# Patient Record
Sex: FEMALE | Race: WHITE | NOT HISPANIC OR LATINO | Employment: OTHER
[De-identification: names, ages, dates, MRNs, and addresses within clinical notes are randomized per-mention and may not be internally consistent; named-entity substitution may affect disease eponyms.]

---

## 2024-06-26 ENCOUNTER — TRANSCRIBE ORDERS (OUTPATIENT)
Dept: OTHER | Age: 69
End: 2024-06-26

## 2024-06-26 DIAGNOSIS — R52 RADIATING PAIN: ICD-10-CM

## 2024-06-26 DIAGNOSIS — M54.2 NECK PAIN: ICD-10-CM

## 2024-06-26 DIAGNOSIS — M54.50 ACUTE LOW BACK PAIN: Primary | ICD-10-CM

## 2024-06-26 DIAGNOSIS — M79.2 RADICULAR PAIN IN RIGHT ARM: ICD-10-CM

## 2024-06-27 ENCOUNTER — TRANSCRIBE ORDERS (OUTPATIENT)
Dept: OTHER | Age: 69
End: 2024-06-27

## 2024-06-27 DIAGNOSIS — M79.2 RADICULAR PAIN IN RIGHT ARM: ICD-10-CM

## 2024-06-27 DIAGNOSIS — R52 RADIATING PAIN: ICD-10-CM

## 2024-06-27 DIAGNOSIS — M54.50 ACUTE LOW BACK PAIN: Primary | ICD-10-CM

## 2024-07-09 NOTE — TELEPHONE ENCOUNTER
SPINE PATIENTS - NEW PROTOCOL PREVISIT    RECORDS RECEIVED FROM: Referred by Meet Lugo   REASON FOR VISIT: Spine  Acute low back pain   Radiating pain   Radicular pain in right arm    PROVIDER: Radha   DATE OF APPT: 07/10/2024   NOTES (FOR ALL VISITS) STATUS DETAILS   OFFICE NOTE from referring provider Internal Referral and notes in chart   OFFICE NOTE from other specialist Internal Cervical injection w/Rayus   DISCHARGE SUMMARY from hospital N/A    DISCHARGE REPORT from ER N/A    OPERATIVE REPORT N/A    EMG REPORT N/A    MEDICATION LIST N/A    IMAGING  (FOR ALL VISITS)     MRI (HEAD, NECK, SPINE) Internal MR cervical w/Rayus 05/08/2024   XRAY (SPINE) *NEUROSURGERY* N/A    CT (HEAD, NECK, SPINE) N/A

## 2024-07-10 ENCOUNTER — PRE VISIT (OUTPATIENT)
Dept: NEUROSURGERY | Facility: CLINIC | Age: 69
End: 2024-07-10
Payer: COMMERCIAL

## 2024-07-10 ENCOUNTER — OFFICE VISIT (OUTPATIENT)
Dept: NEUROSURGERY | Facility: CLINIC | Age: 69
End: 2024-07-10
Attending: NURSE PRACTITIONER
Payer: COMMERCIAL

## 2024-07-10 ENCOUNTER — DOCUMENTATION ONLY (OUTPATIENT)
Dept: NEUROSURGERY | Facility: CLINIC | Age: 69
End: 2024-07-10
Payer: COMMERCIAL

## 2024-07-10 VITALS
HEIGHT: 70 IN | BODY MASS INDEX: 32.93 KG/M2 | SYSTOLIC BLOOD PRESSURE: 117 MMHG | DIASTOLIC BLOOD PRESSURE: 72 MMHG | WEIGHT: 230 LBS

## 2024-07-10 DIAGNOSIS — M54.12 CERVICAL RADICULOPATHY: Primary | ICD-10-CM

## 2024-07-10 DIAGNOSIS — M79.2 RADICULAR PAIN IN RIGHT ARM: ICD-10-CM

## 2024-07-10 PROCEDURE — G0463 HOSPITAL OUTPT CLINIC VISIT: HCPCS | Performed by: PHYSICIAN ASSISTANT

## 2024-07-10 PROCEDURE — 99203 OFFICE O/P NEW LOW 30 MIN: CPT | Performed by: PHYSICIAN ASSISTANT

## 2024-07-10 RX ORDER — GABAPENTIN 300 MG/1
600 CAPSULE ORAL
COMMUNITY
Start: 2024-06-27

## 2024-07-10 RX ORDER — AMLODIPINE AND BENAZEPRIL HYDROCHLORIDE 5; 40 MG/1; MG/1
1 CAPSULE ORAL DAILY
COMMUNITY
Start: 2023-12-15

## 2024-07-10 RX ORDER — ATORVASTATIN CALCIUM 20 MG/1
1 TABLET, FILM COATED ORAL AT BEDTIME
COMMUNITY
Start: 2023-12-15

## 2024-07-10 RX ORDER — LIRAGLUTIDE 6 MG/ML
INJECTION SUBCUTANEOUS
COMMUNITY
Start: 2024-04-26

## 2024-07-10 RX ORDER — CLOBETASOL PROPIONATE 0.5 MG/G
CREAM TOPICAL
COMMUNITY
Start: 2023-09-15

## 2024-07-10 ASSESSMENT — PAIN SCALES - GENERAL: PAINLEVEL: EXTREME PAIN (8)

## 2024-07-10 NOTE — PROGRESS NOTES
"NEUROSURGERY CLINIC CONSULT NOTE     DATE OF VISIT: 7/10/2024     SUBJECTIVE:     Nancy L Pleschourt is a pleasant 69 year old female who presents to the clinic today for consultation on cervical radiculopathy-right sided. She is referred to the Neurosurgery Clinic by Dr. Parish CEDILLO. Pertinent medical history consists of seen by NGUYEN Lugo who recommended surgical intervention but was not sure on levels he would operate on and therefore referred to our team for second surgical opinion.     Today, she reports a 3-month history of symptoms. She describes interrmittent sharp pain from her neck into her deltoid and \"pins and needles pain\" in her forearm; does not radiate into her right hand. This pain is accompanied by paresthesia, numbness and/or perceived weakness in the same distribution. Movement, lifting aggravate the symptoms, while alleviation is obtained by nothing. Moving heavy chair in mid April is associated with the onset of the pain. There are no bowel or bladder changes. She denies saddle anesthesia. She denies changes in gait, instability, or falling episodes. There has been significant change in her handwriting or hand dexterity. She also can no longer lift her granddaughter due to weakness in RUE.     She has participated in conservative therapies to include physical therapy, NSAIDs, heat, ice, a T1-T2 epidural steroid injection. None of these modalities have provided any significant long term relief.     Patient is diabetic, able to bring her A1C down from 11 to 8.0 recently. No blood thinners.      Current Outpatient Medications:     amLODIPine-benazepril (LOTREL) 5-40 MG capsule, Take 1 capsule by mouth daily, Disp: , Rfl:     atorvastatin (LIPITOR) 20 MG tablet, Take 1 tablet by mouth at bedtime, Disp: , Rfl:     clobetasol propionate (TEMOVATE) 0.05 % external cream, Apply topically to affected area(s) two times daily., Disp: , Rfl:     Continuous Glucose  (FREESTYLE JAYJAY 2 READER) " "FRANCOIS, , Disp: , Rfl:     Continuous Glucose Sensor (FREESTYLE JAYJAY 2 SENSOR) MISC, To be used to read blood sugars per 's directions., Disp: , Rfl:     Continuous Glucose Sensor (FREESTYLE JAYJAY 2 SENSOR) MISC, , Disp: , Rfl:     CONTOUR NEXT TEST test strip, , Disp: , Rfl:     gabapentin (NEURONTIN) 300 MG capsule, Take 600 mg by mouth, Disp: , Rfl:     insulin NPH-Regular 70/30 (HUMULIN 70/30;NOVOLIN 70/30) (70-30) 100 UNIT/ML vial, 90 units SQ before breakfast and 65 units before dinner., Disp: , Rfl:     liraglutide (VICTOZA PEN) 18 MG/3ML solution, Inject 1.8mg subcutaneous once daily, Disp: , Rfl:      Allergies   Allergen Reactions    Metformin Diarrhea        No past medical history on file.     ROS: 10 point review of symptoms are negative other than the symptoms noted above in the HPI.     Family History has been reviewed with the patient, there are no pertinent findings to presenting concern.     No past surgical history on file.     Social History     Tobacco Use    Smoking status: Former     Types: Cigarettes    Smokeless tobacco: Never        OBJECTIVE:   /72   Ht 1.778 m (5' 10\")   Wt 104.3 kg (230 lb)   BMI 33.00 kg/m     Body mass index is 33 kg/m .     Imaging:     SEE MEDIA FOR IMAGING REPORTS     Full radiological report in chart. Imaging was reviewed with with patient today.     Exam:   CN II-XII grossly intact, alert and appropriate with conversation and following commands.   Gait is non-antalgic.   Cervical spine is tender to palpation mid to lower. Appropriate range of motion of neck, not concerning for lhermitte's phenomenon. +spurlings right sided   Bilateral bicep 2/4 and tricep reflexes 1/4. Sensation intact throughout upper extremities.     UE muscle strength  Right  Left    Deltoid  5/5  5/5    Biceps  5/5  5/5    Triceps   Wrist flexion  4/5  4/5  5/5   5/5   Hand intrinsics  4/5  5/5    Hand grasp  4/5  5/5    Zazueta signs  neg  neg      Intact sensation " "throughout lower extremities.   Bilateral patellar 2/4 and achilles reflex 1/4.     LE muscle strength  Right  Left    Iliopsoas (hip flexion)  5/5  5/5    Quad (knee extension)  5/5  5/5    Hamstring (knee flexion)  5/5  5/5    Gastrocnemius (PF)  5/5  5/5    Tibialis Ant. (DF)  5/5  5/5    EHL  5/5  5/5    Negative for clonus.   Calves are soft and non-tender bilaterally.     ASSESSMENT/PLAN:     Nancy L Pleschourt is a pleasant 69 year old female who presents to the clinic today for consultation on cervical radiculopathy-right sided. She is referred to the Neurosurgery Clinic by Dr. Parish CEDILLO. Pertinent medical history consists of seen by [facility] Dr. Lugo who recommended surgical intervention but was not sure on levels he would operate on and therefore referred to our team for second surgical opinion.     Today, she reports a 3-month history of symptoms. She describes interrmittent sharp pain from her neck into her deltoid and \"pins and needles pain\" in her forearm; does not radiate into her right hand. This pain is accompanied by paresthesia, numbness and/or perceived weakness in the same distribution. Movement, lifting aggravate the symptoms, while alleviation is obtained by nothing. Moving heavy chair in mid April is associated with the onset of the pain. There are no bowel or bladder changes. She denies saddle anesthesia. She denies changes in gait, instability, or falling episodes. There has been significant change in her handwriting or hand dexterity. She also can no longer lift her granddaughter due to weakness in RUE.     She has participated in conservative therapies to include physical therapy, NSAIDs, heat, ice, a T1-T2 epidural steroid injection. None of these modalities have provided any significant long term relief.      PLAN:   -EMG urgent referral sent. They will call you to schedule   -follow up with Dr. Santos or Dr. Olmstead after EMG to review surgical options  -continue PT and other conservative " measures you have been doing at home     Contact our office or present sooner should symptoms significantly worsen     Matilde Larkin PA-C  [facility]  [address] [address]  Tel [phone]  Fax [phone]

## 2024-07-10 NOTE — NURSING NOTE
"Nancy L Pleschourt is a 69 year old female who presents for:  Chief Complaint   Patient presents with    Consult        Vitals:    Vitals:    07/10/24 1006   BP: 117/72   Weight: 230 lb (104.3 kg)   Height: 5' 10\" (1.778 m)       BMI:  Estimated body mass index is 33 kg/m  as calculated from the following:    Height as of this encounter: 5' 10\" (1.778 m).    Weight as of this encounter: 230 lb (104.3 kg).    Pain Score:  Extreme Pain (8)        Amendo Phorn      "

## 2024-07-10 NOTE — CONFIDENTIAL NOTE
Scrubbing chart and requesting appropriate cervical records.    P [phone] - [facility]  Requesting cervical MRI from 5/8/24 and cervical XR from week prior pushed to [facility]. Being pushed now. Writer will create order exam and load imaging in to pt chart with report attached in [facility] once imaging is received in [facility] [place].     Orders made. Imaging loaded in. Provider updated.      Signed by Callie Webb on July 10, 2024 10:49 AM

## 2024-07-10 NOTE — PATIENT INSTRUCTIONS
-EMG urgent referral sent. They will call you to schedule   -follow up with Dr. Santos after EMG to review surgical options  -continue PT and other conservative measures you have been doing at home     Contact our office or present sooner should symptoms significantly worsen     Matilde Larkin PA-C  [facility]  [address] [place] [address]  Tel [phone]  Fax [phone]

## 2024-07-10 NOTE — LETTER
"7/10/2024      Nancy L Pleschourt  [address] [place] [id number]      Dear Colleague,    Thank you for referring your patient, Nancy L Pleschourt, to the [facility]OS[facility] [place]. Please see a copy of my visit note below.    NEUROSURGERY CLINIC CONSULT NOTE     DATE OF VISIT: 7/10/2024     SUBJECTIVE:     Nancy L Pleschourt is a pleasant 69 year old female who presents to the clinic today for consultation on cervical radiculopathy-right sided. She is referred to the Neurosurgery Clinic by Dr. Parish CEDILLO. Pertinent medical history consists of seen by NGUYEN Lugo who recommended surgical intervention but was not sure on levels he would operate on and therefore referred to our team for second surgical opinion.     Today, she reports a 3-month history of symptoms. She describes interrmittent sharp pain from her neck into her deltoid and \"pins and needles pain\" in her forearm; does not radiate into her right hand. This pain is accompanied by paresthesia, numbness and/or perceived weakness in the same distribution. Movement, lifting aggravate the symptoms, while alleviation is obtained by nothing. Moving heavy chair in mid April is associated with the onset of the pain. There are no bowel or bladder changes. She denies saddle anesthesia. She denies changes in gait, instability, or falling episodes. There has been significant change in her handwriting or hand dexterity. She also can no longer lift her granddaughter due to weakness in RUE.     She has participated in conservative therapies to include physical therapy, NSAIDs, heat, ice, a T1-T2 epidural steroid injection. None of these modalities have provided any significant long term relief.     Patient is diabetic, able to bring her A1C down from 11 to 8.0 recently. No blood thinners.      Current Outpatient Medications:      amLODIPine-benazepril (LOTREL) 5-40 MG capsule, Take 1 capsule by mouth daily, Disp: , Rfl:      " "atorvastatin (LIPITOR) 20 MG tablet, Take 1 tablet by mouth at bedtime, Disp: , Rfl:      clobetasol propionate (TEMOVATE) 0.05 % external cream, Apply topically to affected area(s) two times daily., Disp: , Rfl:      Continuous Glucose  (FREESTYLE JAYJAY 2 READER) FRANCOIS, , Disp: , Rfl:      Continuous Glucose Sensor (FREESTYLE JAYJAY 2 SENSOR) MISC, To be used to read blood sugars per 's directions., Disp: , Rfl:      Continuous Glucose Sensor (FREESTYLE JAYJAY 2 SENSOR) MISC, , Disp: , Rfl:      CONTOUR NEXT TEST test strip, , Disp: , Rfl:      gabapentin (NEURONTIN) 300 MG capsule, Take 600 mg by mouth, Disp: , Rfl:      insulin NPH-Regular 70/30 (HUMULIN 70/30;NOVOLIN 70/30) (70-30) 100 UNIT/ML vial, 90 units SQ before breakfast and 65 units before dinner., Disp: , Rfl:      liraglutide (VICTOZA PEN) 18 MG/3ML solution, Inject 1.8mg subcutaneous once daily, Disp: , Rfl:      Allergies   Allergen Reactions     Metformin Diarrhea        No past medical history on file.     ROS: 10 point review of symptoms are negative other than the symptoms noted above in the HPI.     Family History has been reviewed with the patient, there are no pertinent findings to presenting concern.     No past surgical history on file.     Social History     Tobacco Use     Smoking status: Former     Types: Cigarettes     Smokeless tobacco: Never        OBJECTIVE:   /72   Ht 1.778 m (5' 10\")   Wt 104.3 kg (230 lb)   BMI 33.00 kg/m     Body mass index is 33 kg/m .     Imaging:     SEE MEDIA FOR IMAGING REPORTS     Full radiological report in chart. Imaging was reviewed with with patient today.     Exam:   CN II-XII grossly intact, alert and appropriate with conversation and following commands.   Gait is non-antalgic.   Cervical spine is tender to palpation mid to lower. Appropriate range of motion of neck, not concerning for lhermitte's phenomenon. +spurlings right sided   Bilateral bicep 2/4 and tricep reflexes " "1/4. Sensation intact throughout upper extremities.     UE muscle strength  Right  Left    Deltoid  5/5  5/5    Biceps  5/5  5/5    Triceps   Wrist flexion  4/5  4/5  5/5   5/5   Hand intrinsics  4/5  5/5    Hand grasp  4/5  5/5    Zazueta signs  neg  neg      Intact sensation throughout lower extremities.   Bilateral patellar 2/4 and achilles reflex 1/4.     LE muscle strength  Right  Left    Iliopsoas (hip flexion)  5/5  5/5    Quad (knee extension)  5/5  5/5    Hamstring (knee flexion)  5/5  5/5    Gastrocnemius (PF)  5/5  5/5    Tibialis Ant. (DF)  5/5  5/5    EHL  5/5  5/5    Negative for clonus.   Calves are soft and non-tender bilaterally.     ASSESSMENT/PLAN:     Nancy L Pleschourt is a pleasant 69 year old female who presents to the clinic today for consultation on cervical radiculopathy-right sided. She is referred to the Neurosurgery Clinic by Dr. Parish CEDILLO. Pertinent medical history consists of seen by [facility] Dr. Lugo who recommended surgical intervention but was not sure on levels he would operate on and therefore referred to our team for second surgical opinion.     Today, she reports a 3-month history of symptoms. She describes interrmittent sharp pain from her neck into her deltoid and \"pins and needles pain\" in her forearm; does not radiate into her right hand. This pain is accompanied by paresthesia, numbness and/or perceived weakness in the same distribution. Movement, lifting aggravate the symptoms, while alleviation is obtained by nothing. Moving heavy chair in mid April is associated with the onset of the pain. There are no bowel or bladder changes. She denies saddle anesthesia. She denies changes in gait, instability, or falling episodes. There has been significant change in her handwriting or hand dexterity. She also can no longer lift her granddaughter due to weakness in RUE.     She has participated in conservative therapies to include physical therapy, NSAIDs, heat, ice, a T1-T2 epidural " steroid injection. None of these modalities have provided any significant long term relief.      PLAN:   -EMG urgent referral sent. They will call you to schedule   -follow up with Dr. Santos or Dr. Olmstead after EMG to review surgical options  -continue PT and other conservative measures you have been doing at home     Contact our office or present sooner should symptoms significantly worsen     Matilde Larkin PA-C  [facility]  [address] [place] [address]  Tel [phone]  Fax [phone]      Again, thank you for allowing me to participate in the care of your patient.        Sincerely,        Matilde Garcia PA-C

## 2024-07-11 ENCOUNTER — OFFICE VISIT (OUTPATIENT)
Dept: PHYSICAL MEDICINE AND REHAB | Facility: CLINIC | Age: 69
End: 2024-07-11
Attending: PHYSICIAN ASSISTANT
Payer: COMMERCIAL

## 2024-07-11 DIAGNOSIS — M79.2 RADICULAR PAIN IN RIGHT ARM: Primary | ICD-10-CM

## 2024-07-11 DIAGNOSIS — M54.12 CERVICAL RADICULOPATHY: ICD-10-CM

## 2024-07-11 PROCEDURE — 95886 MUSC TEST DONE W/N TEST COMP: CPT | Performed by: PHYSICAL MEDICINE & REHABILITATION

## 2024-07-11 PROCEDURE — 95910 NRV CNDJ TEST 7-8 STUDIES: CPT | Performed by: PHYSICAL MEDICINE & REHABILITATION

## 2024-07-11 NOTE — PROGRESS NOTES
[facility]  [address] [place] [address] [address]  [place] [address]  Office: [phone] Fax: [phone]    Electromyography and Nerve Conduction Study Report        Indication: Patient presents at the request of Matilde Garcia PA-C for right upper extremity EMG.  She has several month history of cervical spine pain with more significantly right parascapular pain chest wall pain and right arm pain and paresthesias into the wrist and weakness of the right hand.  On exam, she has normal sensation to light touch throughout the bilateral upper extremities.  2+ bicep, tricep, brachioradialis reflexes with negative Bruna's.  5/5  5 -/5 strength interosseous of the right hand bilateral wrist extensors, long finger flexors, elbow flexors and extensors.        Pt Exam Discussion (Communication Barriers):  Electromyography and nerve conduction testing, including associated discomfort, risks, benefits, and alternatives was discussed with the patient prior to the procedure.  No learning/ communication barriers; patient verbalized understanding of procedure.  Informed consent was obtained.           Pt Assessment:  Testing was successfully completed; patient tolerated testing well.       Blood Thinners: None Skin Temperature: Warmed 33.8                     EMG/NCS  results:     Nerve Conduction Studies  Motor Sites      Segment Distal Latency Neg. Amp CV F-Latency F-Estimate Comment   Site  (ms) (mV) (m/s) (ms) (ms)    Right Median (APB) Motor   Wrist Wrist-APB 4.0 1.58       Elbow Elbow-Wrist 9.2 *1.16 49      Right Ulnar (ADM) Motor   Wrist  2.6 7.2       Bel Elbow Bel Elbow-Wrist 7.0 6.3 53      Ab Elbow Ab Elbow-Wrist 9.3 5.8 53        Sensory Sites      Onset Lat Peak Lat Amp CV Comment   Site (ms) (ms) ( V) (m/s)    Left Medial Antebrachial Cutaneous Sensory   Elbow-Med Forearm 1.50 1.83 11 -    Right Medial Antebrachial Cutaneous Sensory   Elbow-Med Forearm 1.13 1.78 11 -    Right Median  Anti Sensory   Wrist-Dig II 2.3 2.9 19 57    Right Median-Ulnar Palmar Sensory        Median   Palm-Wrist 1.40 1.83 78 57         Ulnar   Palm-Wrist 1.40 1.80 26 57    Right Radial Sensory   Forearm-Wrist 1.90 2.2 22 53    Right Ulnar Anti Sensory   Wrist-Dig V 2.1 2.5 13 52        NCS Waveforms:    Motor         Sensory                      Electromyography     Side Muscle Nerve Root Ins Act Fibs Psw Fasc Recrt Dur Amp Poly Comment   Right Deltoid Axillary C5-6 Nml Nml Nml Nml Nml Nml Nml 0    Right Triceps Radial C6-7-8 Nml Nml Nml Nml Nml Nml Nml 0    Right PronatorTeres Median C6-7 Nml Nml Nml Nml Nml Nml Nml 0    Right 1stDorInt Ulnar C8-T1 *Incr *1+ *1+ Nml *Reduced Nml Nml 0    Right Abd Poll Brev Median C8-T1 *Incr *1+ *1+ Nml Nml Nml Nml *1+    Right Ext Indicis2 Radial (Post Int) C7-8 Nml Nml Nml Nml Nml Nml Nml 0    Right FlexCarpiUln2 Ulnar C8,T1 Nml Nml Nml Nml Nml Nml Nml 0        Comment NCS: Abnormal study  1.  Low amplitude right median CMAP diffusely with normal conduction velocity.  2.  Diffuse borderline amplitude right ulnar CMAP.  3.  Normal right median ulnar and radial SNAPs, right median and ulnar transcarpal studies, and bilateral MACs.    Comment EMG: Abnormal study  1.  Increased insertional activity with positive waves and fibrillation potentials right FDI and APB along with mild polyphasic units of the APB and mild reduced agreement of the FDI.  Remainder right upper extremity needle EMG normal.    Interpretation: Abnormal study: There is electrodiagnostic evidence of:    1.  Right T1 and/or C8 radiculopathy, active.  I cannot completely exclude a right lower trunk brachial plexopathy, but given the nerve conduction study findings with symmetric medial antebrachial cutaneous SNAPs, brachial plexopathy is considered less likely.    2.  There is no electrodiagnostic evidence of median or ulnar neuropathy in the right upper extremity.    The testing was completed in its entirety by the  physician.       It was our pleasure caring for your patient today, if there any questions or concerns please do not hesitate to contact us.

## 2024-07-11 NOTE — LETTER
7/11/2024      Nancy L Pleschourt  [address] [place] [address]      Dear Colleague,    Thank you for referring your patient, Nancy L Pleschourt, to the [facility]. Please see a copy of my visit note below.    [facility]  [address] [place] [address] [address]  [place] [address]  Office: [phone] Fax: [phone]    Electromyography and Nerve Conduction Study Report        Indication: Patient presents at the request of Matilde Garcia PA-C for right upper extremity EMG.  She has several month history of cervical spine pain with more significantly right parascapular pain chest wall pain and right arm pain and paresthesias into the wrist and weakness of the right hand.  On exam, she has normal sensation to light touch throughout the bilateral upper extremities.  2+ bicep, tricep, brachioradialis reflexes with negative Bruna's.  5/5  5 -/5 strength interosseous of the right hand bilateral wrist extensors, long finger flexors, elbow flexors and extensors.        Pt Exam Discussion (Communication Barriers):  Electromyography and nerve conduction testing, including associated discomfort, risks, benefits, and alternatives was discussed with the patient prior to the procedure.  No learning/ communication barriers; patient verbalized understanding of procedure.  Informed consent was obtained.           Pt Assessment:  Testing was successfully completed; patient tolerated testing well.       Blood Thinners: None Skin Temperature: Warmed 33.8                     EMG/NCS  results:     Nerve Conduction Studies  Motor Sites      Segment Distal Latency Neg. Amp CV F-Latency F-Estimate Comment   Site  (ms) (mV) (m/s) (ms) (ms)    Right Median (APB) Motor   Wrist Wrist-APB 4.0 1.58       Elbow Elbow-Wrist 9.2 *1.16 49      Right Ulnar (ADM) Motor   Wrist  2.6 7.2       Bel Elbow Bel Elbow-Wrist 7.0 6.3 53      Ab Elbow Ab Elbow-Wrist 9.3 5.8 53        Sensory  Sites      Onset Lat Peak Lat Amp CV Comment   Site (ms) (ms) ( V) (m/s)    Left Medial Antebrachial Cutaneous Sensory   Elbow-Med Forearm 1.50 1.83 11 -    Right Medial Antebrachial Cutaneous Sensory   Elbow-Med Forearm 1.13 1.78 11 -    Right Median Anti Sensory   Wrist-Dig II 2.3 2.9 19 57    Right Median-Ulnar Palmar Sensory        Median   Palm-Wrist 1.40 1.83 78 57         Ulnar   Palm-Wrist 1.40 1.80 26 57    Right Radial Sensory   Forearm-Wrist 1.90 2.2 22 53    Right Ulnar Anti Sensory   Wrist-Dig V 2.1 2.5 13 52        NCS Waveforms:    Motor         Sensory                      Electromyography     Side Muscle Nerve Root Ins Act Fibs Psw Fasc Recrt Dur Amp Poly Comment   Right Deltoid Axillary C5-6 Nml Nml Nml Nml Nml Nml Nml 0    Right Triceps Radial C6-7-8 Nml Nml Nml Nml Nml Nml Nml 0    Right PronatorTeres Median C6-7 Nml Nml Nml Nml Nml Nml Nml 0    Right 1stDorInt Ulnar C8-T1 *Incr *1+ *1+ Nml *Reduced Nml Nml 0    Right Abd Poll Brev Median C8-T1 *Incr *1+ *1+ Nml Nml Nml Nml *1+    Right Ext Indicis2 Radial (Post Int) C7-8 Nml Nml Nml Nml Nml Nml Nml 0    Right FlexCarpiUln2 Ulnar C8,T1 Nml Nml Nml Nml Nml Nml Nml 0        Comment NCS: Abnormal study  1.  Low amplitude right median CMAP diffusely with normal conduction velocity.  2.  Diffuse borderline amplitude right ulnar CMAP.  3.  Normal right median ulnar and radial SNAPs, right median and ulnar transcarpal studies, and bilateral MACs.    Comment EMG: Abnormal study  1.  Increased insertional activity with positive waves and fibrillation potentials right FDI and APB along with mild polyphasic units of the APB and mild reduced agreement of the FDI.  Remainder right upper extremity needle EMG normal.    Interpretation: Abnormal study: There is electrodiagnostic evidence of:    1.  Right T1 and/or C8 radiculopathy, active.  I cannot completely exclude a right lower trunk brachial plexopathy, but given the nerve conduction study findings with  symmetric medial antebrachial cutaneous SNAPs, brachial plexopathy is considered less likely.    2.  There is no electrodiagnostic evidence of median or ulnar neuropathy in the right upper extremity.    The testing was completed in its entirety by the physician.       It was our pleasure caring for your patient today, if there any questions or concerns please do not hesitate to contact us.    Again, thank you for allowing me to participate in the care of your patient.        Sincerely,        Negro Man, DO

## 2024-07-18 ENCOUNTER — TELEPHONE (OUTPATIENT)
Dept: NEUROSURGERY | Facility: CLINIC | Age: 69
End: 2024-07-18

## 2024-07-18 ENCOUNTER — OFFICE VISIT (OUTPATIENT)
Dept: NEUROSURGERY | Facility: CLINIC | Age: 69
End: 2024-07-18
Payer: COMMERCIAL

## 2024-07-18 VITALS
HEART RATE: 77 BPM | DIASTOLIC BLOOD PRESSURE: 92 MMHG | HEIGHT: 70 IN | SYSTOLIC BLOOD PRESSURE: 149 MMHG | BODY MASS INDEX: 32.93 KG/M2 | WEIGHT: 230 LBS

## 2024-07-18 DIAGNOSIS — M79.2 RADICULAR PAIN IN RIGHT ARM: Primary | ICD-10-CM

## 2024-07-18 DIAGNOSIS — M79.2 RADICULAR PAIN IN RIGHT ARM: ICD-10-CM

## 2024-07-18 DIAGNOSIS — M54.12 CERVICAL RADICULOPATHY: Primary | ICD-10-CM

## 2024-07-18 PROCEDURE — 99204 OFFICE O/P NEW MOD 45 MIN: CPT | Performed by: STUDENT IN AN ORGANIZED HEALTH CARE EDUCATION/TRAINING PROGRAM

## 2024-07-18 NOTE — NURSING NOTE
"Nancy L Pleschourt's goals for this visit include:   Chief Complaint   Patient presents with    RECHECK     Follow up after EMG       She requests these members of her care team be copied on today's visit information: yes      PCP: Aleyda Lambert    Referring Provider:  Referred Self, MD  No address on file    BP (!) 149/92 (BP Location: Right arm, Patient Position: Sitting, Cuff Size: Adult Regular)   Pulse 77   Ht 1.778 m (5' 10\")   Wt 104.3 kg (230 lb)   BMI 33.00 kg/m      Do you need any medication refills at today's visit? No  AIDA Murray, CMA ([facility])      "

## 2024-07-18 NOTE — PROGRESS NOTES
"HPI:  Current Outpatient Medications   Medication Sig Dispense Refill    amLODIPine-benazepril (LOTREL) 5-40 MG capsule Take 1 capsule by mouth daily      atorvastatin (LIPITOR) 20 MG tablet Take 1 tablet by mouth at bedtime      clobetasol propionate (TEMOVATE) 0.05 % external cream Apply topically to affected area(s) two times daily.      Continuous Glucose  (FREESTYLE JAYJAY 2 READER) FRANCOIS       Continuous Glucose Sensor (FREESTYLE JAYJAY 2 SENSOR) MISC To be used to read blood sugars per 's directions.      Continuous Glucose Sensor (FREESTYLE JAYJAY 2 SENSOR) MISC       CONTOUR NEXT TEST test strip       gabapentin (NEURONTIN) 300 MG capsule Take 600 mg by mouth      insulin NPH-Regular 70/30 (HUMULIN 70/30;NOVOLIN 70/30) (70-30) 100 UNIT/ML vial 90 units SQ before breakfast and 65 units before dinner.      liraglutide (VICTOZA PEN) 18 MG/3ML solution Inject 1.8mg subcutaneous once daily       No current facility-administered medications for this visit.      Physical Exam:  Vital signs:      BP: (!) 149/92 Pulse: 77           Height: 177.8 cm (5' 10\") Weight: 104.3 kg (230 lb)  Estimated body mass index is 33 kg/m  as calculated from the following:    Height as of this encounter: 1.778 m (5' 10\").    Weight as of this encounter: 104.3 kg (230 lb).  Results Reviewed:  Assessment:  Plan:    "

## 2024-07-18 NOTE — PROGRESS NOTES
"HPI:  69-year-old female with right-sided neck pain into the right arm.  The symptoms have been going on for few months have not improved and may be getting worse over time.  The pain is mostly into the proximal aspect of the right upper extremity.  She does have pain including numbness and tingling into the hand.  This does not affect any specific fingers in the hand.  She notes most of her pain is above the elbow however.  She has tried an epidural steroid injection in the past which did not seem to help at all.  She recently had an EMG which showed a possible C8 or T1 radiculopathy.  She returns today to discuss findings and treatment options.  Raising the arm up does seem to improve the symptoms while lowering the arm down worsens the symptoms.  Current Outpatient Medications   Medication Sig Dispense Refill    amLODIPine-benazepril (LOTREL) 5-40 MG capsule Take 1 capsule by mouth daily      atorvastatin (LIPITOR) 20 MG tablet Take 1 tablet by mouth at bedtime      clobetasol propionate (TEMOVATE) 0.05 % external cream Apply topically to affected area(s) two times daily.      Continuous Glucose  (FREESTYLE JAYJAY 2 READER) FRANCOIS       Continuous Glucose Sensor (FREESTYLE JAYJAY 2 SENSOR) MISC To be used to read blood sugars per 's directions.      Continuous Glucose Sensor (FREESTYLE JAYJAY 2 SENSOR) MISC       CONTOUR NEXT TEST test strip       gabapentin (NEURONTIN) 300 MG capsule Take 600 mg by mouth      insulin NPH-Regular 70/30 (HUMULIN 70/30;NOVOLIN 70/30) (70-30) 100 UNIT/ML vial 90 units SQ before breakfast and 65 units before dinner.      liraglutide (VICTOZA PEN) 18 MG/3ML solution Inject 1.8mg subcutaneous once daily       No current facility-administered medications for this visit.      Physical Exam:  Vital signs:      BP: (!) 149/92 Pulse: 77           Height: 177.8 cm (5' 10\") Weight: 104.3 kg (230 lb)  Estimated body mass index is 33 kg/m  as calculated from the following:    " "Height as of this encounter: 1.778 m (5' 10\").    Weight as of this encounter: 104.3 kg (230 lb).  She has full strength in her bilateral upper extremities.  Sensation is intact light touch throughout.  No Bruna sign present.  Results Reviewed:  I personally viewed the images of an MRI of the cervical spine.  This shows multilevel spondylosis throughout the cervical spine.  There is a T1-T2 disc herniation which does likely narrows the right foramen at T1-2.  There is no significant central canal stenosis at this level.  There is no significant foraminal stenosis on the right at C7-T1.  Assessment:  69-year-old female with a possible T1 radiculopathy from a T1-2 disc herniation.  Her EMG is consistent with a possible T1 radiculopathy and imaging most consistently fits with this diagnosis as well.  While her symptoms do go into the hand the majority of her symptoms are on the upper extremity which would be consistent with a T1 radiculopathy as well.  Other possibilities would include thoracic outlet syndrome although symptoms would likely get worse with raising the arm instead of improved.  Plan:  I would like to start with a CT scan of the cervical spine to evaluate for calcification of the disc as well as bony anatomy around this area.  In addition she had discussed after the first injection that another injection would be amenable to try as well.  I would recommend trying this at this point.  She does have high A1c and this would need to be below 8 to be considered for surgery.  Should she be able to get her A1c below 8 and not have improvement with further injections surgery would be a possibility.  This would be via at T1 to procedure.  We would likely need to do a full facetectomy on the right side in order to get around this area to get to the disc herniation.  I would then likely supplement this with a T1-2 fusion as well.  I will have her follow-up with me after the CT scan if she does not improve with " further injections.    Lb Santos MD

## 2024-07-18 NOTE — LETTER
7/18/2024      Nancy L Pleschourt  [address] [place] [id number]      Dear Colleague,    Thank you for referring your patient, Nancy L Pleschourt, to the [facility] [place]. Please see a copy of my visit note below.    HPI:  69-year-old female with right-sided neck pain into the right arm.  The symptoms have been going on for few months have not improved and may be getting worse over time.  The pain is mostly into the proximal aspect of the right upper extremity.  She does have pain including numbness and tingling into the hand.  This does not affect any specific fingers in the hand.  She notes most of her pain is above the elbow however.  She has tried an epidural steroid injection in the past which did not seem to help at all.  She recently had an EMG which showed a possible C8 or T1 radiculopathy.  She returns today to discuss findings and treatment options.  Raising the arm up does seem to improve the symptoms while lowering the arm down worsens the symptoms.  Current Outpatient Medications   Medication Sig Dispense Refill     amLODIPine-benazepril (LOTREL) 5-40 MG capsule Take 1 capsule by mouth daily       atorvastatin (LIPITOR) 20 MG tablet Take 1 tablet by mouth at bedtime       clobetasol propionate (TEMOVATE) 0.05 % external cream Apply topically to affected area(s) two times daily.       Continuous Glucose  (FREESTYLE JAYJAY 2 READER) FRANCOIS        Continuous Glucose Sensor (FREESTYLE JAYJAY 2 SENSOR) MISC To be used to read blood sugars per 's directions.       Continuous Glucose Sensor (FREESTYLE JAYJAY 2 SENSOR) MISC        CONTOUR NEXT TEST test strip        gabapentin (NEURONTIN) 300 MG capsule Take 600 mg by mouth       insulin NPH-Regular 70/30 (HUMULIN 70/30;NOVOLIN 70/30) (70-30) 100 UNIT/ML vial 90 units SQ before breakfast and 65 units before dinner.       liraglutide (VICTOZA PEN) 18 MG/3ML solution Inject 1.8mg subcutaneous once daily    "    No current facility-administered medications for this visit.      Physical Exam:  Vital signs:      BP: (!) 149/92 Pulse: 77           Height: 177.8 cm (5' 10\") Weight: 104.3 kg (230 lb)  Estimated body mass index is 33 kg/m  as calculated from the following:    Height as of this encounter: 1.778 m (5' 10\").    Weight as of this encounter: 104.3 kg (230 lb).  She has full strength in her bilateral upper extremities.  Sensation is intact light touch throughout.  No Bruna sign present.  Results Reviewed:  I personally viewed the images of an MRI of the cervical spine.  This shows multilevel spondylosis throughout the cervical spine.  There is a T1-T2 disc herniation which does likely narrows the right foramen at T1-2.  There is no significant central canal stenosis at this level.  There is no significant foraminal stenosis on the right at C7-T1.  Assessment:  69-year-old female with a possible T1 radiculopathy from a T1-2 disc herniation.  Her EMG is consistent with a possible T1 radiculopathy and imaging most consistently fits with this diagnosis as well.  While her symptoms do go into the hand the majority of her symptoms are on the upper extremity which would be consistent with a T1 radiculopathy as well.  Other possibilities would include thoracic outlet syndrome although symptoms would likely get worse with raising the arm instead of improved.  Plan:  I would like to start with a CT scan of the cervical spine to evaluate for calcification of the disc as well as bony anatomy around this area.  In addition she had discussed after the first injection that another injection would be amenable to try as well.  I would recommend trying this at this point.  She does have high A1c and this would need to be below 8 to be considered for surgery.  Should she be able to get her A1c below 8 and not have improvement with further injections surgery would be a possibility.  This would be via at T1 to procedure.  We would " likely need to do a full facetectomy on the right side in order to get around this area to get to the disc herniation.  I would then likely supplement this with a T1-2 fusion as well.  I will have her follow-up with me after the CT scan if she does not improve with further injections.    Lb Santos MD      Again, thank you for allowing me to participate in the care of your patient.        Sincerely,        Lb Santos MD

## 2024-07-18 NOTE — TELEPHONE ENCOUNTER
[facility] Call Center    Phone Message    May a detailed message be left on voicemail: yes     Reason for Call: Other: Pt called stating she tried to call to schedule injection but needs order put in to do so.      Action Taken: Message routed to:  Other: [facility]    Travel Screening: Not Applicable     Date of Service:

## 2024-08-09 DIAGNOSIS — M54.2 CERVICALGIA: Primary | ICD-10-CM

## 2024-08-21 ENCOUNTER — HOSPITAL ENCOUNTER (OUTPATIENT)
Dept: CT IMAGING | Facility: CLINIC | Age: 69
Discharge: HOME | End: 2024-08-21
Attending: PHYSICIAN ASSISTANT | Admitting: PHYSICIAN ASSISTANT
Payer: MEDICARE

## 2024-08-21 DIAGNOSIS — M54.2 CERVICALGIA: ICD-10-CM

## 2024-08-21 PROCEDURE — G1010 CDSM STANSON: HCPCS

## 2024-08-29 ENCOUNTER — OFFICE VISIT (OUTPATIENT)
Dept: NEUROSURGERY | Facility: CLINIC | Age: 69
End: 2024-08-29
Payer: COMMERCIAL

## 2024-08-29 VITALS
SYSTOLIC BLOOD PRESSURE: 121 MMHG | HEART RATE: 81 BPM | BODY MASS INDEX: 32.93 KG/M2 | HEIGHT: 70 IN | WEIGHT: 230 LBS | DIASTOLIC BLOOD PRESSURE: 72 MMHG

## 2024-08-29 DIAGNOSIS — M79.2 RADICULAR PAIN IN RIGHT ARM: Primary | ICD-10-CM

## 2024-08-29 PROCEDURE — 99214 OFFICE O/P EST MOD 30 MIN: CPT | Performed by: STUDENT IN AN ORGANIZED HEALTH CARE EDUCATION/TRAINING PROGRAM

## 2024-08-29 ASSESSMENT — PAIN SCALES - GENERAL: PAINLEVEL: EXTREME PAIN (8)

## 2024-08-29 NOTE — PROGRESS NOTES
HPI:  69-year-old female with right-sided neck pain into the right arm. The symptoms have been going on for few months have not improved and may be getting worse over time. The pain is mostly into the proximal aspect of the right upper extremity. She does have pain including numbness and tingling into the hand. This does not affect any specific fingers in the hand. She notes most of her pain is above the elbow however. She has tried an epidural steroid injection in the past which did not seem to help at all. She recently had an EMG which showed a possible C8 or T1 radiculopathy. She returns today to discuss findings and treatment options. Raising the arm up does seem to improve the symptoms while lowering the arm down worsens the symptoms.     She returns today noting some improvement in the pain in the back and in the chest however does continue to have pain down the arm which has not really improved that much.  She denies any new numbness or weakness.  Current Outpatient Medications   Medication Sig Dispense Refill    amLODIPine-benazepril (LOTREL) 5-40 MG capsule Take 1 capsule by mouth daily      atorvastatin (LIPITOR) 20 MG tablet Take 1 tablet by mouth at bedtime      clobetasol propionate (TEMOVATE) 0.05 % external cream Apply topically to affected area(s) two times daily.      Continuous Glucose  (FREESTYLE JAYJAY 2 READER) FRANCOIS       Continuous Glucose Sensor (FREESTYLE JAYJAY 2 SENSOR) MISC To be used to read blood sugars per 's directions.      Continuous Glucose Sensor (FREESTYLE JAYJAY 2 SENSOR) MISC       CONTOUR NEXT TEST test strip       gabapentin (NEURONTIN) 300 MG capsule Take 600 mg by mouth      insulin NPH-Regular 70/30 (HUMULIN 70/30;NOVOLIN 70/30) (70-30) 100 UNIT/ML vial 90 units SQ before breakfast and 65 units before dinner.      liraglutide (VICTOZA PEN) 18 MG/3ML solution Inject 1.8mg subcutaneous once daily       No current facility-administered medications for this  "visit.      Physical Exam:  Vital signs:      BP: 121/72 Pulse: 81           Height: 177.8 cm (5' 10\") Weight: 104.3 kg (230 lb)  Estimated body mass index is 33 kg/m  as calculated from the following:    Height as of this encounter: 1.778 m (5' 10\").    Weight as of this encounter: 104.3 kg (230 lb).  She has full strength in her bilateral upper and lower extremities.  Sensations intact to light touch throughout.  Results Reviewed:  I personally viewed the images of an MRI of the cervical spine. This shows multilevel spondylosis throughout the cervical spine. There is a T1-T2 disc herniation which does likely narrows the right foramen at T1-2. There is no significant central canal stenosis at this level. There is no significant foraminal stenosis on the right at C7-T1.   I also personally reviewed the images of his CT scan of the thoracic spine.  This shows a calcified rim around the T1-T2 disc.  Assessment:  69-year-old female with a likely T1 radiculopathy from a T1-T2 disc herniation.  Plan:  I have placed a referral order for a transforaminal injection at T1-T2 to see if this localizes the pain and then also could potentially improve her symptoms going forward.  We did discuss surgery would be an option if she does get her A1c improved and this would be via a laminotomy and foraminotomy and possibly a fusion.  She can follow-up with me again if she does continue to have significant pain would like to discuss surgery again.    Lb Santos MD  "

## 2024-08-29 NOTE — LETTER
8/29/2024      Nancy L Pleschourt  [address] [place] [id number]      Dear Colleague,    Thank you for referring your patient, Nancy L Pleschourt, to the [facility] [place]. Please see a copy of my visit note below.    HPI:  69-year-old female with right-sided neck pain into the right arm. The symptoms have been going on for few months have not improved and may be getting worse over time. The pain is mostly into the proximal aspect of the right upper extremity. She does have pain including numbness and tingling into the hand. This does not affect any specific fingers in the hand. She notes most of her pain is above the elbow however. She has tried an epidural steroid injection in the past which did not seem to help at all. She recently had an EMG which showed a possible C8 or T1 radiculopathy. She returns today to discuss findings and treatment options. Raising the arm up does seem to improve the symptoms while lowering the arm down worsens the symptoms.     She returns today noting some improvement in the pain in the back and in the chest however does continue to have pain down the arm which has not really improved that much.  She denies any new numbness or weakness.  Current Outpatient Medications   Medication Sig Dispense Refill     amLODIPine-benazepril (LOTREL) 5-40 MG capsule Take 1 capsule by mouth daily       atorvastatin (LIPITOR) 20 MG tablet Take 1 tablet by mouth at bedtime       clobetasol propionate (TEMOVATE) 0.05 % external cream Apply topically to affected area(s) two times daily.       Continuous Glucose  (FREESTYLE JAYJAY 2 READER) FRANCOIS        Continuous Glucose Sensor (FREESTYLE JAYJAY 2 SENSOR) MISC To be used to read blood sugars per 's directions.       Continuous Glucose Sensor (FREESTYLE JAYJAY 2 SENSOR) MISC        CONTOUR NEXT TEST test strip        gabapentin (NEURONTIN) 300 MG capsule Take 600 mg by mouth       insulin NPH-Regular  "70/30 (HUMULIN 70/30;NOVOLIN 70/30) (70-30) 100 UNIT/ML vial 90 units SQ before breakfast and 65 units before dinner.       liraglutide (VICTOZA PEN) 18 MG/3ML solution Inject 1.8mg subcutaneous once daily       No current facility-administered medications for this visit.      Physical Exam:  Vital signs:      BP: 121/72 Pulse: 81           Height: 177.8 cm (5' 10\") Weight: 104.3 kg (230 lb)  Estimated body mass index is 33 kg/m  as calculated from the following:    Height as of this encounter: 1.778 m (5' 10\").    Weight as of this encounter: 104.3 kg (230 lb).  She has full strength in her bilateral upper and lower extremities.  Sensations intact to light touch throughout.  Results Reviewed:  I personally viewed the images of an MRI of the cervical spine. This shows multilevel spondylosis throughout the cervical spine. There is a T1-T2 disc herniation which does likely narrows the right foramen at T1-2. There is no significant central canal stenosis at this level. There is no significant foraminal stenosis on the right at C7-T1.   I also personally reviewed the images of his CT scan of the thoracic spine.  This shows a calcified rim around the T1-T2 disc.  Assessment:  69-year-old female with a likely T1 radiculopathy from a T1-T2 disc herniation.  Plan:  I have placed a referral order for a transforaminal injection at T1-T2 to see if this localizes the pain and then also could potentially improve her symptoms going forward.  We did discuss surgery would be an option if she does get her A1c improved and this would be via a laminotomy and foraminotomy and possibly a fusion.  She can follow-up with me again if she does continue to have significant pain would like to discuss surgery again.    Lb Santos MD      Again, thank you for allowing me to participate in the care of your patient.        Sincerely,        Lb Santos MD  "

## 2024-09-11 NOTE — TELEPHONE ENCOUNTER
Urbano Santos MD  You14 hours ago (5:56 PM)     Right sided.    Carmen Pablo LPN; Urbano Santos MD1 month ago     Blane Morales, I would but I'm not sure if the injection should be left, right, or bilateral.    Dr. Santos, could you please place the order for the injection & we can get it faxed over to [facility]?    Thanks in advance,  Carmen Booth LPN  [facility] [place]1 month ago       Could one of you pend the order for this, please?      Urbano Santos MD Piche, Lindsey, CARON1 month ago       I would suggest T1-2 foraminal injection    urbano

## 2024-09-11 NOTE — TELEPHONE ENCOUNTER
Dr. Santos  placed an order for a RIGHT T1-2 foraminal injection on 8/29. Review telephone encounter from 9/4 for further updates.      JORGE ManriqueN RN Care Coordinator  Neurology/Neurosurgery/PM&R/Pain Management

## 2024-10-07 ENCOUNTER — TELEPHONE (OUTPATIENT)
Dept: ANESTHESIOLOGY | Facility: CLINIC | Age: 69
End: 2024-10-07
Payer: COMMERCIAL

## 2024-10-07 DIAGNOSIS — M54.14 THORACIC RADICULITIS: Primary | ICD-10-CM

## 2024-10-07 NOTE — TELEPHONE ENCOUNTER
Called patient to schedule procedure with Dr. Fritz    Date of Procedure: 11/19/24    Arrival time given: Yes: Arrival Time 10am       Procedure Location: [facility]     Verified Location with Patient:  Yes  Address provided to the patient     Pre-op H&P Required:  No: Local anesthesia        Post-Op/Follow Up Appt:  Not Indicated in Request      Informed patient they will need a  to drive them home:  Yes    Patients : Spouse     Patient is aware that pre-op RN from the procedure center will call 2-3 days prior to scheduled procedure to confirm arrival time and review any instructions:  Yes       Additional Comments: N/A        Shae Lee MA on 10/7/2024 at 12:04 PM      P: [phone]*

## 2024-10-07 NOTE — TELEPHONE ENCOUNTER
RN called patient and left a voicemail. Writer called to discuss pre-procedure instructions with patient for upcoming procedure with Dr. Fritz on 11/19/24. Writer left call back number to pain clinic to discuss with nursing staff.     Camelia Meyers RN

## 2024-11-19 ENCOUNTER — HOSPITAL ENCOUNTER (OUTPATIENT)
Facility: AMBULATORY SURGERY CENTER | Age: 69
Discharge: HOME | End: 2024-11-19
Payer: MEDICARE

## 2024-11-19 ENCOUNTER — ANCILLARY PROCEDURE (OUTPATIENT)
Dept: RADIOLOGY | Facility: AMBULATORY SURGERY CENTER | Age: 69
End: 2024-11-19
Payer: COMMERCIAL

## 2024-11-19 VITALS
DIASTOLIC BLOOD PRESSURE: 81 MMHG | BODY MASS INDEX: 32.93 KG/M2 | HEIGHT: 70 IN | RESPIRATION RATE: 15 BRPM | TEMPERATURE: 97.3 F | HEART RATE: 71 BPM | WEIGHT: 230 LBS | OXYGEN SATURATION: 97 % | SYSTOLIC BLOOD PRESSURE: 141 MMHG

## 2024-11-19 DIAGNOSIS — M54.14 THORACIC RADICULITIS: ICD-10-CM

## 2024-11-19 PROBLEM — I77.810 AORTIC ROOT DILATION (H): Status: ACTIVE | Noted: 2022-03-28

## 2024-11-19 PROBLEM — I85.00 ESOPHAGEAL VARICES WITHOUT BLEEDING, UNSPECIFIED ESOPHAGEAL VARICES TYPE (H): Status: ACTIVE | Noted: 2023-12-15

## 2024-11-19 PROBLEM — E11.42 DIABETIC POLYNEUROPATHY ASSOCIATED WITH TYPE 2 DIABETES MELLITUS (H): Status: ACTIVE | Noted: 2024-03-18

## 2024-11-19 PROBLEM — D12.6 ADENOMATOUS COLON POLYP: Status: ACTIVE | Noted: 2020-12-16

## 2024-11-19 PROBLEM — M54.2 NECK PAIN: Status: ACTIVE | Noted: 2018-10-21

## 2024-11-19 LAB — GLUCOSE BLDC GLUCOMTR-MCNC: 272 MG/DL (ref 70–99)

## 2024-11-19 PROCEDURE — 62321 NJX INTERLAMINAR CRV/THRC: CPT

## 2024-11-19 RX ORDER — LIDOCAINE HYDROCHLORIDE 10 MG/ML
INJECTION, SOLUTION EPIDURAL; INFILTRATION; INTRACAUDAL; PERINEURAL DAILY PRN
Status: DISCONTINUED | OUTPATIENT
Start: 2024-11-19 | End: 2024-11-19 | Stop reason: HOSPADM

## 2024-11-19 RX ORDER — BETAMETHASONE SODIUM PHOSPHATE AND BETAMETHASONE ACETATE 3; 3 MG/ML; MG/ML
INJECTION, SUSPENSION INTRA-ARTICULAR; INTRALESIONAL; INTRAMUSCULAR; SOFT TISSUE DAILY PRN
Status: DISCONTINUED | OUTPATIENT
Start: 2024-11-19 | End: 2024-11-19 | Stop reason: HOSPADM

## 2024-11-19 RX ORDER — PEN NEEDLE, DIABETIC 32GX 5/32"
NEEDLE, DISPOSABLE MISCELLANEOUS
COMMUNITY
Start: 2024-02-05

## 2024-11-19 RX ORDER — LORATADINE 10 MG/1
10 TABLET ORAL
COMMUNITY
Start: 2023-03-01

## 2024-11-19 RX ORDER — PEN NEEDLE, DIABETIC 29 G X1/2"
NEEDLE, DISPOSABLE MISCELLANEOUS
COMMUNITY
Start: 2024-10-03

## 2024-11-19 RX ORDER — IOPAMIDOL 408 MG/ML
INJECTION, SOLUTION INTRATHECAL DAILY PRN
Status: DISCONTINUED | OUTPATIENT
Start: 2024-11-19 | End: 2024-11-19 | Stop reason: HOSPADM

## 2024-11-19 NOTE — DISCHARGE INSTRUCTIONS
Home Care Instructions after an Epidural Steroid Pain Injection    A lumbar epidural steroid injection delivers steroid medication directly into the area that may be causing your lower back pain and/or leg pain. A cervical or thoracic epidural steroid injection delivers steroids into the epidural space surrounding spinal nerve roots to help relieve pain in the upper spine/neck.    Activity  -Rest today  -Do not work today  -Resume normal activity tomorrow  -DO NOT shower for 24 hours  -DO NOT remove bandaid for 24 hours    Pain  -You may experience soreness at the injection site for one or two days  -You may use an ice pack for 20 minutes every 2 hours for the first 24 hours  -You may use a heating pad after the first 24 hours  -You may use Tylenol (acetaminophen) every 4 hours or other pain medicines as     directed by your physician    You may experience numbness radiating into your legs or arms (depending on the procedure location). This numbness may last several hours. Until sensation returns to normal; please use caution in walking, climbing stairs, and stepping out of your vehicle, etc.      Common side effects of steroids:  Not everyone will experience corticosteroid side effects. If side effects are experienced, they will gradually subside in the 7-10 day period following an injection. Most common side effects include:  -Flushed face and/or chest  -Feeling of warmth, particularly in the face but could be an overall feeling of warmth  -Increased blood sugar in diabetic patients  -Menstrual irregularities my occur. If taking hormone-based birth control an alternate method of birth control is recommended  -Sleep disturbances and/or mood swings are possible  -Leg cramps    Please contact us if you have:  -Severe pain  -Fever more than 101.5 degrees Fahrenheit  -Signs of infection at the injection site (redness, swelling, or drainage)    FOR PAIN CENTER PATIENTS:  If you have questions, please contact the Pain  Clinic at [phone] Option #1 between the hours of 7:00 am and 3:00 pm Monday through Friday. After office hours you can contact the on call provider by dialing [phone]. If you need immediate attention, we recommend that you go to a hospital emergency room or dial [phone].

## 2024-11-19 NOTE — OR NURSING
Patient's blood sugar was 272 prior to procedure, MD would still like to proceed with epidural steroid injection. Patient educated on risks of procedure and potential for increase in blood sugars over the next 7-10 days. Patient also has rash on her chest that she states has been there for 6 months, MD requested she reach out to her PCP to been seen or get a referral to dermatology.     KAREN Carmichael

## 2024-11-19 NOTE — OP NOTE
Epidural Steroid Injection  The patient s identity, the procedure to be performed and the specific site of the procedure was verified in accordance with [facility] Protocol.    Diagnosis: Thoracic Radiculitis  Pre-procedure Pain Score: 7/10    Procedure Note:    Informed consent was obtained.  The patient was placed comfortably into a prone position and was then prepped and draped in a sterile fashion.  There was no evidence of infection at the site of needle insertion.  The skin was anesthetized with 1% lidocaine and a tuohy needle was advanced under fluoroscopic guidance into the epidural space using a loss of resistance technique.  CSF was not aspirated, blood was not aspirated, paresthesia was not noted.  Position was confirmed with radio-opaque contrast.  The patient tolerated the procedure without complications.  The patient was observed for 30 minutes and was then released with post-procedure instructions.    The patient was given discharge instructions and verbalizes understanding, including understanding of those signs and symptoms that would require emergency care.     Level:T1/2  Laterality: right     Needle Type:   20g touhy        Medication:  12mg celestone  2ml Normal Saline,           Post Procedure Pain Score: 4/10      Counseling: Greater than 50% of this patient visit was spent in counseling the patient regarding the treatment of their pain, coordinating their overall treatment plan and assessing their progress.

## (undated) DEVICE — TRAY PAIN INJECTION 97A 640

## (undated) DEVICE — GLOVE BIOGEL PI MICRO SZ 8.0 48580

## (undated) DEVICE — PREP CHLORAPREP W/ORANGE TINT 10.5ML 260715

## (undated) DEVICE — NDL SPINAL 25GA 2" YALE 405078